# Patient Record
Sex: FEMALE | Race: WHITE | Employment: UNEMPLOYED | URBAN - METROPOLITAN AREA
[De-identification: names, ages, dates, MRNs, and addresses within clinical notes are randomized per-mention and may not be internally consistent; named-entity substitution may affect disease eponyms.]

---

## 2021-03-30 ENCOUNTER — TELEPHONE (OUTPATIENT)
Dept: NEUROLOGY | Facility: CLINIC | Age: 61
End: 2021-03-30

## 2021-03-30 ENCOUNTER — TRANSCRIBE ORDERS (OUTPATIENT)
Dept: OTHER | Age: 61
End: 2021-03-30

## 2021-03-30 DIAGNOSIS — Z82.0 FAMILY HISTORY OF MUSCULAR DYSTROPHY: ICD-10-CM

## 2021-03-30 DIAGNOSIS — G60.9 IDIOPATHIC PERIPHERAL NEUROPATHY: Primary | ICD-10-CM

## 2021-03-30 DIAGNOSIS — R29.2 BRISK DEEP TENDON REFLEXES: ICD-10-CM

## 2021-03-30 DIAGNOSIS — E53.8 LOW SERUM VITAMIN B12: ICD-10-CM

## 2021-03-30 NOTE — TELEPHONE ENCOUNTER
----- Message from Kiah Reynoso RN sent at 3/30/2021  3:33 PM CDT -----  Please call to reschedule with Arina Dupont   ----- Message -----  From: Eduardo Membreno MD  Sent: 3/30/2021   2:44 PM CDT  To: Kiah Reynoso RN    Yes, she should see me. Thanks.  ----- Message -----  From: Kiah Reynoso RN  Sent: 3/30/2021   2:29 PM CDT  To: Eduardo Membreno MD    This pt is being referred to us by neurology in [facility] and is scheduled to see Dr Dobson in May. According to the notes, Dr Stone is wanting the pt to be evaluated by a neuromuscular specialist. There are EMG/NCS reports listed in Care Everywhere. Could you take a look at the chart and let us know if we can get them rescheduled to see you.     Kiah

## 2021-03-30 NOTE — TELEPHONE ENCOUNTER
I called patient and reschedule her to see Dr. Membreno on 05/12/2021 at 11am.    Columba Ling LPN

## 2021-04-28 ENCOUNTER — TELEPHONE (OUTPATIENT)
Dept: NEUROLOGY | Facility: CLINIC | Age: 61
End: 2021-04-28

## 2021-04-28 NOTE — TELEPHONE ENCOUNTER
Pt called inquiring if her consult with Dr. Membreno would require her to be in-person. Pt [place] and does not want to travel so far if not necessary. Pt advised that she would likely need to come in-person to get a thorough assessment from Dr. Membreno since this would be her first visit. Pt reports she has had labs/imaging/EMG and various consults already completed so wondering if Dr. Membreno would need to see her face-to-face or if these records would be sufficient. Would prefer a telephone/virtual visit if possible. Message routed to provider to advise. Will contact pt with provider's response.       Carol Arenas, RNCC  Neurology

## 2021-04-29 NOTE — TELEPHONE ENCOUNTER
This should be an in-person evaluation. If that is not feasible I certainly understand and encourage her to find a neurology provider closer to home.

## 2021-04-30 NOTE — TELEPHONE ENCOUNTER
Pt informed that Dr. Membreno would like to see her in-person. Pt has been seen by various providers and neurologists in her area with no answers so she has been hesitant about traveling further while getting so answers as well. However, pt agreed and would still like to be seen per recommendation.       Carol Arenas, RNCC  Neurology

## 2021-04-30 NOTE — TELEPHONE ENCOUNTER
VM left for pt to call back at earliest convenience. Will discuss provider's request for pt to be seen in-person.       Carol Arenas, RNCC  Neurology